# Patient Record
Sex: MALE | Race: WHITE | Employment: OTHER | URBAN - METROPOLITAN AREA
[De-identification: names, ages, dates, MRNs, and addresses within clinical notes are randomized per-mention and may not be internally consistent; named-entity substitution may affect disease eponyms.]

---

## 2018-05-14 ENCOUNTER — APPOINTMENT (OUTPATIENT)
Dept: GENERAL RADIOLOGY | Facility: HOSPITAL | Age: 83
DRG: 871 | End: 2018-05-14
Attending: EMERGENCY MEDICINE
Payer: MEDICARE

## 2018-05-14 ENCOUNTER — HOSPITAL ENCOUNTER (INPATIENT)
Facility: HOSPITAL | Age: 83
LOS: 4 days | Discharge: NURSING, REHAB OR LONG-TERM CARE | DRG: 871 | End: 2018-05-18
Attending: EMERGENCY MEDICINE | Admitting: FAMILY MEDICINE
Payer: MEDICARE

## 2018-05-14 DIAGNOSIS — J18.9 SEPSIS DUE TO PNEUMONIA (HCC): ICD-10-CM

## 2018-05-14 DIAGNOSIS — J18.9 NOSOCOMIAL PNEUMONIA: Primary | ICD-10-CM

## 2018-05-14 DIAGNOSIS — J69.0 ASPIRATION PNEUMONITIS (HCC): ICD-10-CM

## 2018-05-14 DIAGNOSIS — A41.9 SEPSIS DUE TO PNEUMONIA (HCC): ICD-10-CM
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
## 2018-05-14 DIAGNOSIS — R73.9 HYPERGLYCEMIA: ICD-10-CM

## 2018-05-14 PROBLEM — R79.89 AZOTEMIA: Status: ACTIVE | Noted: 2018-05-14

## 2018-05-14 PROCEDURE — 99223 1ST HOSP IP/OBS HIGH 75: CPT | Performed by: INTERNAL MEDICINE

## 2018-05-14 PROCEDURE — 71045 X-RAY EXAM CHEST 1 VIEW: CPT | Performed by: EMERGENCY MEDICINE

## 2018-05-14 RX ORDER — ACETAMINOPHEN 500 MG
500 TABLET ORAL EVERY 4 HOURS PRN
COMMUNITY

## 2018-05-14 RX ORDER — LIDOCAINE 50 MG/G
1 PATCH TOPICAL EVERY 24 HOURS
Status: DISCONTINUED | OUTPATIENT
Start: 2018-05-14 | End: 2018-05-18

## 2018-05-14 RX ORDER — ACETAMINOPHEN 500 MG
500 TABLET ORAL EVERY 4 HOURS PRN
Status: DISCONTINUED | OUTPATIENT
Start: 2018-05-14 | End: 2018-05-18

## 2018-05-14 RX ORDER — IPRATROPIUM BROMIDE AND ALBUTEROL SULFATE 2.5; .5 MG/3ML; MG/3ML
3 SOLUTION RESPIRATORY (INHALATION)
COMMUNITY

## 2018-05-14 RX ORDER — HEPARIN SODIUM 5000 [USP'U]/ML
5000 INJECTION, SOLUTION INTRAVENOUS; SUBCUTANEOUS EVERY 12 HOURS SCHEDULED
Status: DISCONTINUED | OUTPATIENT
Start: 2018-05-14 | End: 2018-05-18

## 2018-05-14 RX ORDER — ASPIRIN 81 MG
1 TABLET, DELAYED RELEASE (ENTERIC COATED) ORAL DAILY
COMMUNITY

## 2018-05-14 RX ORDER — MINERAL OIL AND PETROLATUM 150; 830 MG/G; MG/G
OINTMENT OPHTHALMIC NIGHTLY
Status: DISCONTINUED | OUTPATIENT
Start: 2018-05-14 | End: 2018-05-18

## 2018-05-14 RX ORDER — ACETAMINOPHEN 325 MG/1
650 TABLET ORAL 3 TIMES DAILY
Status: ON HOLD | COMMUNITY
End: 2018-05-18

## 2018-05-14 RX ORDER — SODIUM CHLORIDE 9 MG/ML
INJECTION, SOLUTION INTRAVENOUS CONTINUOUS
Status: DISCONTINUED | OUTPATIENT
Start: 2018-05-14 | End: 2018-05-14

## 2018-05-14 RX ORDER — RIVASTIGMINE 13.3 MG/24H
1 PATCH, EXTENDED RELEASE TRANSDERMAL
Status: DISCONTINUED | OUTPATIENT
Start: 2018-05-14 | End: 2018-05-18

## 2018-05-14 RX ORDER — ZINC OXIDE
OINTMENT (GRAM) TOPICAL 3 TIMES DAILY
Status: DISCONTINUED | OUTPATIENT
Start: 2018-05-14 | End: 2018-05-18

## 2018-05-14 RX ORDER — MINERAL OIL, WHITE PETROLATUM .03; .94 G/G; G/G
OINTMENT OPHTHALMIC NIGHTLY
COMMUNITY

## 2018-05-14 RX ORDER — ATORVASTATIN CALCIUM 10 MG/1
10 TABLET, FILM COATED ORAL NIGHTLY
Status: ON HOLD | COMMUNITY
End: 2018-05-18

## 2018-05-14 RX ORDER — LIDOCAINE 50 MG/G
1 PATCH TOPICAL EVERY 24 HOURS
COMMUNITY

## 2018-05-14 RX ORDER — DEXTROSE MONOHYDRATE 25 G/50ML
50 INJECTION, SOLUTION INTRAVENOUS AS NEEDED
Status: DISCONTINUED | OUTPATIENT
Start: 2018-05-14 | End: 2018-05-18

## 2018-05-14 RX ORDER — AMOXICILLIN AND CLAVULANATE POTASSIUM 875; 125 MG/1; MG/1
1 TABLET, FILM COATED ORAL 2 TIMES DAILY
COMMUNITY

## 2018-05-14 RX ORDER — CHOLECALCIFEROL (VITAMIN D3) 1250 MCG
5000 CAPSULE ORAL DAILY
Status: ON HOLD | COMMUNITY
End: 2018-05-18

## 2018-05-14 RX ORDER — ATORVASTATIN CALCIUM 10 MG/1
10 TABLET, FILM COATED ORAL NIGHTLY
Status: DISCONTINUED | OUTPATIENT
Start: 2018-05-14 | End: 2018-05-18

## 2018-05-14 RX ORDER — IPRATROPIUM BROMIDE AND ALBUTEROL SULFATE 2.5; .5 MG/3ML; MG/3ML
3 SOLUTION RESPIRATORY (INHALATION)
Status: DISCONTINUED | OUTPATIENT
Start: 2018-05-14 | End: 2018-05-17

## 2018-05-14 RX ORDER — SODIUM CHLORIDE 0.9 % (FLUSH) 0.9 %
3 SYRINGE (ML) INJECTION AS NEEDED
Status: DISCONTINUED | OUTPATIENT
Start: 2018-05-14 | End: 2018-05-18

## 2018-05-14 RX ORDER — RIVASTIGMINE 13.3 MG/24H
1 PATCH, EXTENDED RELEASE TRANSDERMAL
COMMUNITY

## 2018-05-14 RX ORDER — SODIUM CHLORIDE 9 MG/ML
INJECTION, SOLUTION INTRAVENOUS CONTINUOUS
Status: DISCONTINUED | OUTPATIENT
Start: 2018-05-14 | End: 2018-05-17

## 2018-05-14 NOTE — PLAN OF CARE
Problem: Patient Centered Care  Goal: Patient preferences are identified and integrated in the patient's plan of care  Interventions:  - What would you like us to know as we care for you?  Keep my wife and daughter informed- Provide timely, complete, and ac to assist at discharge as needed  - Consider post-discharge preferences of patient/family/discharge partner  - Complete POLST form as appropriate  - Assess patient's ability to be responsible for managing their own health  - Refer to Case Management Depart

## 2018-05-14 NOTE — ED PROVIDER NOTES
Patient Seen in: [facility] AND [facility] Emergency Department    History   Patient presents with:  Hyperglycemia (metabolic)  Dyspnea MAINOR SOB (respiratory)    Stated Complaint:     HPI    Patient presents emergency department with shortness of breath and gener Physical Exam   Constitutional:   Chronically ill-appearing elderly male in no distress. HENT:   Head: Normocephalic. Eyes: Conjunctivae and EOM are normal.   Neck: Normal range of motion. Neck supple.    Cardiovascular: Normal rate and regular        6.0                  120                     7.0                  150                     8.0                  180             100-260       9.0                  210             130-290   HEMOGLOBIN A1C - Abnormal; Not the following:     POC Glucose  162 (*)     All other components within normal limits   POCT GLUCOSE - Abnormal; Notable for the following:     POC Glucose  205 (*)     All other components within normal limits   POCT GLUCOSE - Abnormal; Notable for the fo REFLEX DO NOT CANCEL X2   CBC WITH DIFFERENTIAL WITH PLATELET    Narrative: The following orders were created for panel order CBC WITH DIFFERENTIAL WITH PLATELET.   Procedure                               Abnormality         Status                     - with patient including need for follow up    Discussed with Dr. Jaylene Cruz. Lactic acid level is significantly elevated. Insulin was given. IV fluids and antibiotics were initiated. Sepsis protocol was begun.       Critical Care Documentation    There we

## 2018-05-14 NOTE — SEPSIS REASSESSMENT
[facility]    Sepsis Reassessment Note    /76 (BP Location: Left arm)   Pulse 79   Temp 98.9 °F (37.2 °C) (Axillary)   Resp 26   Ht 5' 10\" (1.778 m)   Wt 150 lb (68 kg)   SpO2 97%   BMI 21.52 kg/m²      11:12 AM    Cardiac:  Regula

## 2018-05-14 NOTE — SLP NOTE
ADULT SWALLOWING EVALUATION    ASSESSMENT    ASSESSMENT/OVERALL IMPRESSION:  Pt seen sitting upright in bed for limited PO trials for BSSE. Pt reportedly with increased fatigue and congestion upon admission.  Pt's wife and daughter at bedside during [address] List  Principal Problem:    Nosocomial pneumonia  Active Problems:    Azotemia    Hyperglycemia    Aspiration pneumonitis ([facility] [address]    Sepsis due to pneumonia [facility])      Past Medical History  Past Medical History:   Diagnosis Date   • Anxiety state    • Sima Progress     FOLLOW UP  Treatment Plan/Recommendations: Aspiration precautions  Number of Visits to Meet Established Goals: 2  Follow Up Needed: Yes  SLP Follow-up Date: 05/15/18    Thank you for your referral.   Gricel Li MA, [address] [address]

## 2018-05-14 NOTE — CONSULTS
[facility]    Consult Note    Date:  5/14/2018  Date of Admission:  5/14/2018      Chief Complaint:   Bethanie Hutton is a(n) 80year old male with cough and fever.     HPI:   The patient has a history of Parkinson's disease with dementia and tablet by mouth 2 (two) times daily. ipratropium-albuterol (DUONEB) 0.5-2.5 (3) MG/3ML Inhalation Solution Take 3 mL by nebulization every 6 (six) hours. MetFORMIN HCl 850 MG Oral Tab Take 850 mg by mouth 2 (two) times daily with meals.    acetaminophen 128/76, pulse 79, temperature 98.9 °F (37.2 °C), temperature source Axillary, resp. rate 26, height 5' 10\" (1.778 m), weight 150 lb (68 kg), SpO2 97 %. Demented white male who can occasionally respond with one-word answers.   HEENT the patient resists [address]  Pager: [phone]

## 2018-05-14 NOTE — CONSULTS
[facility] ID CONSULT NOTE    Austin Hodgkins Patient Status:  Inpatient    1928 MRN [id number]   Location [facility] 5SW/SE Attending Jie Cortez MD   Hosp Day # 0 PCP Eligio Yu MD       Reason for C Known Allergies    Medications:    Current Facility-Administered Medications:   •  acetaminophen (TYLENOL EXTRA STRENGTH) tab 500 mg, 500 mg, Oral, Q4H PRN  •  atorvastatin (LIPITOR) tab 10 mg, 10 mg, Oral, Nightly  •  carbidopa-levodopa (SINEMET)  M arousable  HEENT: Moist mucous membranes. PERRLA, dry eyes  Neck: No lymphadenopathy. Supple. Cardiovascular: Regular rate and rhythm  Respiratory: Coarse breath sounds on R side  Abdomen: Soft, nontender, nondistended.    Musculoskeletal: No edema noted CXR with RLL infiltrate c/w aspiration pneumonia, UA benign, RO bacteremia   -FU blood cx  # Parkinson's Disease with worsening dysphagia in last few weeks  # Diabetes Mellitus  # HTN    PLAN:  -  Continue on IV zosyn.  -  Follow fever curve, wbc.   -  Revi

## 2018-05-15 ENCOUNTER — APPOINTMENT (OUTPATIENT)
Dept: GENERAL RADIOLOGY | Facility: HOSPITAL | Age: 83
DRG: 871 | End: 2018-05-15
Attending: FAMILY MEDICINE
Payer: MEDICARE

## 2018-05-15 PROCEDURE — 99232 SBSQ HOSP IP/OBS MODERATE 35: CPT | Performed by: INTERNAL MEDICINE

## 2018-05-15 PROCEDURE — 74230 X-RAY XM SWLNG FUNCJ C+: CPT | Performed by: FAMILY MEDICINE

## 2018-05-15 NOTE — CM/SW NOTE
SW self-referred to meet w/ pt due to case finding and diagnosis. SW met w/ pt's wife to discuss eventual discharge needs. Pt is from [place] long term and has lived there for about 3 years. Pt's wife reports that she lives nearby in [place].  Pt'

## 2018-05-15 NOTE — PLAN OF CARE
Problem: Patient Centered Care  Goal: Patient preferences are identified and integrated in the patient's plan of care  Interventions:  - What would you like us to know as we care for you?  Keep my wife and daughter informed- Provide timely, complete, and ac transportation as appropriate  - Identify discharge learning needs (meds, wound care, etc)  - Arrange for interpreters to assist at discharge as needed  - Consider post-discharge preferences of patient/family/discharge partner  - Complete POLST form as [place]

## 2018-05-15 NOTE — SLP NOTE
ADULT VIDEOFLUOROSCOPIC SWALLOWING STUDY    Admission Date: 5/14/2018  Evaluation Date: 05/15/18  Radiologist: Tressa Batista    RECOMMENDATIONS   Diet Recommendations - Solids: NPO  Diet Recommendations - Liquid: NPO    Further Follow-up:  Follow Up Needed:  Yes Impaired  Triggered at: Valleculae  Premature Spillage to: Valleculae  Delay (seconds):  (3-4)  Residue Severity, Location: Mod/Severe  Cleared/Reduced with: Attempted however ineffective  Laryngeal Penetration: During the swallow;Trace; After the swallow caregiver/family education. EDUCATION/INSTRUCTION  Reviewed results and recommendations with patient/family/caregiver. Agreement/Understanding verbalized and all questions answered to their apparent satisfaction.     INTERDISCIPLINARY COMMUNICATION  Re

## 2018-05-15 NOTE — PROGRESS NOTES
[facility]    Progress Note    Hugo Ingram Patient Status:  Inpatient    1928 MRN [id number]   Location [facility] 5SW/SE Attending Marilee Vital MD   Hosp Day # 1 PCP Rick Nair MD       Subjective:   Amarilys Mann lower lobe. Findings may be acute or chronic.      Dictated by (CST): Marley Winkler MD on 5/14/2018 at 9:21     Approved by (CST): Marley Winkler MD on 5/14/2018 at 9:25          Ekg 12-lead    Result Date: 5/14/2018  ECG Report  Interpretation

## 2018-05-15 NOTE — H&P
[facility]    PATIENT'S NAME: [email or web address], [place]   ATTENDING PHYSICIAN: Mckenna Hernandez MD   PATIENT ACCOUNT#:   [id number]    LOCATION:  [address] [place] [address]  MEDICAL RECORD #:   [id number]       YOB: 1928  ADMISSION DATE:       05/14/ 127/74, pulse 72, respirations 20, temperature 98.1, T-max 100.4. O2 saturations were 97% on oxygen. HEENT:  Eyes were slightly reddened. Mucosa was moist.  NECK:  No masses. LUNGS:  Decreased breath sounds at the right base.   HEART:  Regular rate and

## 2018-05-15 NOTE — PROGRESS NOTES
[facility] ID PROGRESS NOTE    Jaziel Mendez Patient Status:  Inpatient    1928 MRN [id number]   Location [facility] 5SW/SE Attending Venu Ramirez MD   Hosp Day # 1 PCP Kvng Chairez MD     Subjective:  Awake, family at the bedside.  NPO for family, he has been coughing more with meals. No diarrhea reported. Was placed on NC yesterday at the NH because of SOB. Had been on PO augmentin for two days prior to this admission.  No fevers reported until this AM. On arrival to the ED, febrile at 100.4

## 2018-05-15 NOTE — PROGRESS NOTES
[facility]    Progress Note      Assessment and Plan:   1. Pneumonia–the patient has elevated lactic acid associated with aspiration pneumonia. The patient has underlying dementia and Parkinson's disease.   The family does not want any PEG charlene–pending    Tomer Bautista MD  Medical Director, [address] [facility]  Medical Director, [facility]  Pager: [phone]

## 2018-05-16 ENCOUNTER — APPOINTMENT (OUTPATIENT)
Dept: GENERAL RADIOLOGY | Facility: HOSPITAL | Age: 83
DRG: 871 | End: 2018-05-16
Attending: INTERNAL MEDICINE
Payer: MEDICARE

## 2018-05-16 PROCEDURE — 99232 SBSQ HOSP IP/OBS MODERATE 35: CPT | Performed by: INTERNAL MEDICINE

## 2018-05-16 PROCEDURE — 71045 X-RAY EXAM CHEST 1 VIEW: CPT | Performed by: INTERNAL MEDICINE

## 2018-05-16 NOTE — PROGRESS NOTES
[facility]    Progress Note    Lili Lal Patient Status:  Inpatient    1928 MRN [id number]   Location [facility] 5SW/SE Attending Damien Campos MD   Hosp Day # 2 PCP Kendrick Neff MD     Subjective:     Unable to with small amount remaining. Prominent pulmonary markings throughout the interstitium may be chronic. No large air space disease.      Dictated by (CST): Doug Del Valle MD on 5/16/2018 at 7:05     Approved by (CST): Doug Del Valle MD on 5/16/2018 at 7:07

## 2018-05-16 NOTE — PLAN OF CARE
Problem: Patient Centered Care  Goal: Patient preferences are identified and integrated in the patient's plan of care  Interventions:  - What would you like us to know as we care for you?  Keep my wife and daughter informed- Provide timely, complete, and ac patient/family/discharge partner  - Complete POLST form as appropriate  - Assess patient's ability to be responsible for managing their own health  - Refer to Case Management Department for coordinating discharge planning if the patient needs post-hospital

## 2018-05-16 NOTE — CM/SW NOTE
Met with patients wife at bedside to explain the BPCI/Medicare program. Patients wife agreed with phone follow up for 3 months from [address] [place] [address] after discharge from [facility]. Patient was enrolled under . BPCI/Medicare letter and brochure provided.

## 2018-05-16 NOTE — PLAN OF CARE
Problem: Patient Centered Care  Goal: Patient preferences are identified and integrated in the patient's plan of care  Interventions:  - What would you like us to know as we care for you?  Keep my wife and daughter informed- Provide timely, complete, and ac discharge resources and transportation as appropriate  - Identify discharge learning needs (meds, wound care, etc)  - Arrange for interpreters to assist at discharge as needed  - Consider post-discharge preferences of patient/family/discharge partner  - Co

## 2018-05-16 NOTE — PROGRESS NOTES
[facility] ID PROGRESS NOTE    Nick Whiteside Patient Status:  Inpatient    1928 MRN [id number]   Location [facility] 5SW/SE Attending Puja Alejandra MD   Hosp Day # 2 PCP Jabari Callejas MD     Subjective:  Awake, wife at the bedside. On 3L O2. pureed diet at the NH. Per family, he has been coughing more with meals. No diarrhea reported. Was placed on NC yesterday at the NH because of SOB. Had been on PO augmentin for two days prior to this admission.  No fevers reported until this AM. On arrival

## 2018-05-16 NOTE — PROGRESS NOTES
[facility]    Progress Note    Kiel Paci Patient Status:  Inpatient    1928 MRN [id number]   Location [facility] 5SW/SE Attending Ti Davis MD   Hosp Day # 2 PCP Robbie Eli MD       Subjective:   Stewart Brand 05/14/2018       No procedure found. Xr Video Swallow (cpt=74230)    Result Date: 5/15/2018  CONCLUSION:  1. There is intermittent laryngeal penetration with intermittent mild silent aspiration.   A full report will follow by the speech pathologist.

## 2018-05-16 NOTE — DIETARY NOTE
ADULT NUTRITION INITIAL ASSESSMENT    Pt is at moderate nutrition risk. Pt meets malnutrition criteria.       CRITERIA FOR MALNUTRITION DIAGNOSIS:  Criteria for non-severe malnutrition diagnosis: chronic illness related to wt loss 5% in 1 month, energy int Other dysphagia    • Parkinson disease ([facility] [address]    • Type 1 diabetes mellitus (HCC)        ANTHROPOMETRICS:  HT: 177.8 cm (5' 10\")  WT: 68 kg (150 lb) (reported wt on admission per spouse)  BMI: Body mass index is 21.52 kg/m².   BMI CLASSIFICATION: 19-24.9 kg

## 2018-05-17 PROBLEM — E46 MALNUTRITION (HCC): Status: ACTIVE | Noted: 2018-05-17

## 2018-05-17 PROCEDURE — 99232 SBSQ HOSP IP/OBS MODERATE 35: CPT | Performed by: INTERNAL MEDICINE

## 2018-05-17 RX ORDER — IPRATROPIUM BROMIDE AND ALBUTEROL SULFATE 2.5; .5 MG/3ML; MG/3ML
3 SOLUTION RESPIRATORY (INHALATION) EVERY 6 HOURS PRN
Status: DISCONTINUED | OUTPATIENT
Start: 2018-05-17 | End: 2018-05-18

## 2018-05-17 RX ORDER — IPRATROPIUM BROMIDE AND ALBUTEROL SULFATE 2.5; .5 MG/3ML; MG/3ML
3 SOLUTION RESPIRATORY (INHALATION)
Status: DISCONTINUED | OUTPATIENT
Start: 2018-05-17 | End: 2018-05-18

## 2018-05-17 NOTE — PROGRESS NOTES
[facility]    Progress Note    Hugo Ingram Patient Status:  Inpatient    1928 MRN [id number]   Location [facility] 5SW/SE Attending Marilee Vital MD   Hosp Day # 3 PCP Rick Nair MD       Subjective:   Amarilys Mann 05/15/2018   ALT 7 (L) 05/15/2018   PTT 32.0 05/14/2018   INR 1.2 05/14/2018   TROP 0.01 05/14/2018       No procedure found. Xr Video Swallow (cpt=74230)    Result Date: 5/15/2018  CONCLUSION:  1.  There is intermittent laryngeal penetration with interm

## 2018-05-17 NOTE — SLP NOTE
Pt's wife verbalized understanding to all information reviewed per VFSS. SLP recommendations for NPO with alternative means of nutrition. However, pt's family decided on PO nutrition and to accept risks of aspiration at this time. SLP to sign off.  RN aware

## 2018-05-17 NOTE — PLAN OF CARE
Problem: Patient Centered Care  Goal: Patient preferences are identified and integrated in the patient's plan of care  Interventions:  - What would you like us to know as we care for you?  Keep my wife and daughter informed- Provide timely, complete, and ac (meds, wound care, etc)  - Arrange for interpreters to assist at discharge as needed  - Consider post-discharge preferences of patient/family/discharge partner  - Complete POLST form as appropriate  - Assess patient's ability to be responsible for managing

## 2018-05-17 NOTE — PROGRESS NOTES
[facility]    Progress Note      Assessment and Plan:   1. Pneumonia– the patient still has a wet cough with aspiration pneumonia; however, his chest x-ray is improving.     Recommendations:  1.   Ongoing antibiotic: Can transition to oral

## 2018-05-17 NOTE — PROGRESS NOTES
[facility] ID PROGRESS NOTE    Bon Singh Patient Status:  Inpatient    1928 MRN [id number]   Location [facility] 5SW/SE Attending Zita Maradiaga MD   Hosp Day # 3 PCP Aruna Fall MD     Subjective:  Awake, wife at the bedside. On 2L O2. the last two weeks. Patient has had decreased appetite and has been less responsive than normal. Patient is incontinent, no chronic indwelling amezcua, and on a pureed diet at the NH. Per family, he has been coughing more with meals. No diarrhea reported.  Wa

## 2018-05-18 VITALS
RESPIRATION RATE: 28 BRPM | SYSTOLIC BLOOD PRESSURE: 137 MMHG | HEIGHT: 70 IN | OXYGEN SATURATION: 96 % | TEMPERATURE: 99 F | DIASTOLIC BLOOD PRESSURE: 60 MMHG | BODY MASS INDEX: 19.52 KG/M2 | WEIGHT: 136.38 LBS | HEART RATE: 73 BPM

## 2018-05-18 PROCEDURE — 99232 SBSQ HOSP IP/OBS MODERATE 35: CPT | Performed by: INTERNAL MEDICINE

## 2018-05-18 RX ORDER — POTASSIUM CHLORIDE 14.9 MG/ML
20 INJECTION INTRAVENOUS ONCE
Status: COMPLETED | OUTPATIENT
Start: 2018-05-18 | End: 2018-05-18

## 2018-05-18 NOTE — CM/SW NOTE
Cristel was notified that pt is able to d/c today    RN requesting 3p d/c time due to pt getting dose of IV Abx  CRISTEL contacted [employer]; agreeable w/ d/c time. CRISTEL sent clinical updates.   CRISTEL left  for wife/Kim  CRISTEL contacted [facility] for ambulance (

## 2018-05-18 NOTE — PLAN OF CARE
Diabetes/Glucose Control    • Glucose maintained within prescribed range Adequate for Discharge        DISCHARGE PLANNING    • Discharge to home or other facility with appropriate resources Adequate for Discharge        Patient Centered Care    • Patient p

## 2018-05-18 NOTE — PROGRESS NOTES
[facility]    Progress Note    Cinda Parsons Patient Status:  Inpatient    1928 MRN [id number]   Location [facility] 5SW/SE Attending Priscilla Guthrie MD   Hosp Day # 4 PCP Tony Cleaning MD       Subjective:   Sharif Dove ointment daily, and O2 2 L.       Results:     Lab Results  Component Value Date   WBC 9.3 05/18/2018   HGB 11.4 (L) 05/18/2018   HCT 34.5 (L) 05/18/2018    05/18/2018   CREATSERUM 0.53 05/18/2018   BUN 7 (L) 05/18/2018    05/18/2018   K 3.7 05

## 2018-05-18 NOTE — DIETARY NOTE
Nutrition Care Note:      Speech Therapy note from 5/17 noted. Pt failed swallow study; SLP recommended NPO with alternate feeding; family refused TF (pt non-verbal); pt is on oral diet. Family is considering hospice option.  Nutrition Care will provide sup

## 2018-05-18 NOTE — PROGRESS NOTES
Pulmonary/Critical Care Follow Up Note    HPI:   Kwasi Humphreys is a 80year old male with Patient presents with:  Hyperglycemia (metabolic)  Dyspnea MAINOR SOB (respiratory)      PCP Lindy Holder MD  Admission Attending Lexis Beltran 15 Day 5,000 Units, 5,000 Units, Subcutaneous, 2 times per day  •  dextrose 50 % injection 50 mL, 50 mL, Intravenous, PRN  •  Glucose-Vitamin C (DEX-4) 4-0.006 g chewable tab 4 tablet, 4 tablet, Oral, Q15 Min PRN  •  glucose (DEX4) oral liquid 15 g, 15 g, Oral, Q

## 2018-05-20 NOTE — DISCHARGE SUMMARY
[facility]    PATIENT'S NAME: [email or web address], [place]   ATTENDING PHYSICIAN: Clarence Robert MD   PATIENT ACCOUNT#:   [id number]    LOCATION:  [address] [place] [address]  MEDICAL RECORD #:   [id number]       YOB: 1928  ADMISSION DATE:       05/14/ cancer. His mother and brother had diabetes. REVIEW OF SYSTEMS:  The patient responded to semper fi, but no further verbalization to questions.        PHYSICAL EXAMINATION AT TIME OF ADMISSION:    GENERAL APPEARANCE:  An 35-year-old white male, opens hi fed.    Dictated By Chris Clark MD  d: 05/18/2018 06:41:30  t: 05/19/2018 19:59:20  [facility] [id number]  [facility]/

## (undated) NOTE — IP AVS SNAPSHOT
[facility]            (For Outpatient Use Only) Initial Admit Date: 5/14/2018   Inpt/Obs Admit Date: Inpt: 5/14/18 / Obs: N/A   Discharge Date:    Araseli Doshi:  [de-identified]   MRN: [de-identified]   CSN: [id number]        [facility] Subscriber ID:  Pt Rel to Subscriber:    Hospital Account Financial Class: Medicare    May 18, 2018

## (undated) NOTE — IP AVS SNAPSHOT
Patient Demographics     Address  [address] Phone  [phone] [facility])      Emergency Contact(s)     Name Relation Home Work [address] [facility] Spouse [phone]        Allergies as of 5/18/2018  Reviewed on: 5/14/2018   No Apply to eye nightly. Insulin Aspart Pen 100 UNIT/ML Sopn  Commonly known as:  [place] Natalie  Next dose due:  5/18/18 dinnertime      Inject 1-7 Units into the skin 3 (three) times daily with meals.    Eligio Yu MD         lidocaine 5 % [facility]  Comm [id number] Piperacillin Sod-Tazobactam So (ZOSYN) 3.375 g in dextrose 5 % 100 mL ADD-vantage 05/18/18 1008 New Bag      [id number] Zinc Oxide (DESITIN) 40 % ointment 05/17/18 1657 Given      [id number] Zinc Oxide (DESITIN) 40 % ointment 05/17/18 2107 Given Flowsheet Row Most Recent Value   Patient Weight  61.9 kg (136 lb 6.4 oz)         Lab Results Last 24 Hours      POTASSIUM [[id number]] (Normal)  Resulted: 05/18/18 0556, Result status: Final result   Ordering provider:  Stone Vaughn MD  05/17/18 0420 R ---------                               -----------         ------                     CBC W/ DIFFERENTIAL[[id number]]          Abnormal            Final result                 Please view results for these tests on the individual orders.     Specimen Inform CHIEF COMPLAINT:  This is an 80year-old who was admitted with a low-grade fever and lung congestion. HISTORY OF PRESENT ILLNESS:  Patient with history of Parkinson's, noninsulin-dependent diabetes mellitus, dementia.   Became congested approximately 2 d ABDOMEN:  Positive bowel sounds, soft, nontender. EXTREMITIES:  No cyanosis, clubbing, or edema. NEUROLOGIC:  He was alert but disoriented. Arms and legs were stiff. SKIN:  Sacral wound, 2.4 x 1.3, superficial.    ASSESSMENT AND PLAN:     1.      Aspi able to get in the wheelchair up until 3 weeks ago but has been failing since then. Over the past few days he has had fever and cough and the cough has been wet. There was no hemoptysis.   No personal nor family history of deep venous thrombosis no histor Carboxymethylcellulose Sodium 1 % Ophthalmic Solution Apply 2 drops to eye 3 (three) times daily. acetaminophen 325 MG Oral Tab Take 650 mg by mouth 3 (three) times daily.    carbidopa-levodopa  MG Oral Tab Take 1 tablet by mouth 3 (three) times orquidea Lungs notable for bibasilar crackles to auscultation right greater than left. Cardiac regular rate and rhythm no murmur. Abdomen nontender, without hepatosplenomegaly and no mass appreciable. Extremities without clubbing cyanosis nor edema.    Neurolog No notes of this type exist for this encounter. Occupational Therapy Notes (last 72 hours) (Notes from 5/15/2018  2:24 PM through 5/18/2018  2:24 PM)     No notes of this type exist for this encounter.       Video Swallow Study Notes     No notes of t